# Patient Record
Sex: FEMALE | Race: WHITE | NOT HISPANIC OR LATINO | Employment: OTHER | URBAN - METROPOLITAN AREA
[De-identification: names, ages, dates, MRNs, and addresses within clinical notes are randomized per-mention and may not be internally consistent; named-entity substitution may affect disease eponyms.]

---

## 2019-12-26 ENCOUNTER — TRANSCRIBE ORDERS (OUTPATIENT)
Dept: ADMINISTRATIVE | Facility: HOSPITAL | Age: 84
End: 2019-12-26

## 2019-12-26 DIAGNOSIS — S52.515A CLOSED NONDISPLACED FRACTURE OF STYLOID PROCESS OF LEFT RADIUS, INITIAL ENCOUNTER: Primary | ICD-10-CM

## 2020-01-03 ENCOUNTER — HOSPITAL ENCOUNTER (OUTPATIENT)
Dept: RADIOLOGY | Facility: HOSPITAL | Age: 85
Discharge: HOME | End: 2020-01-03
Payer: MEDICARE

## 2020-01-03 DIAGNOSIS — S52.515A CLOSED NONDISPLACED FRACTURE OF STYLOID PROCESS OF LEFT RADIUS, INITIAL ENCOUNTER: ICD-10-CM

## 2020-01-03 PROCEDURE — 73200 CT UPPER EXTREMITY W/O DYE: CPT

## 2021-08-11 ENCOUNTER — APPOINTMENT (EMERGENCY)
Dept: RADIOLOGY | Facility: HOSPITAL | Age: 86
End: 2021-08-11
Payer: MEDICARE

## 2021-08-11 ENCOUNTER — HOSPITAL ENCOUNTER (EMERGENCY)
Facility: HOSPITAL | Age: 86
Discharge: HOME | End: 2021-08-11
Attending: EMERGENCY MEDICINE | Admitting: EMERGENCY MEDICINE
Payer: MEDICARE

## 2021-08-11 VITALS
WEIGHT: 197 LBS | TEMPERATURE: 97.5 F | DIASTOLIC BLOOD PRESSURE: 72 MMHG | HEART RATE: 72 BPM | SYSTOLIC BLOOD PRESSURE: 170 MMHG | OXYGEN SATURATION: 91 % | RESPIRATION RATE: 16 BRPM

## 2021-08-11 DIAGNOSIS — M25.561 BILATERAL KNEE PAIN: ICD-10-CM

## 2021-08-11 DIAGNOSIS — S00.83XA CONTUSION OF FOREHEAD, INITIAL ENCOUNTER: ICD-10-CM

## 2021-08-11 DIAGNOSIS — M25.562 BILATERAL KNEE PAIN: ICD-10-CM

## 2021-08-11 DIAGNOSIS — R07.89 CHEST WALL PAIN: ICD-10-CM
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
## 2021-08-11 DIAGNOSIS — S01.21XA LACERATION OF NOSE, INITIAL ENCOUNTER: ICD-10-CM

## 2021-08-11 DIAGNOSIS — M50.30 DEGENERATIVE DISC DISEASE, CERVICAL: ICD-10-CM

## 2021-08-11 DIAGNOSIS — S80.01XA CONTUSION OF RIGHT KNEE, INITIAL ENCOUNTER: ICD-10-CM

## 2021-08-11 DIAGNOSIS — S00.81XA ABRASION OF FOREHEAD, INITIAL ENCOUNTER: ICD-10-CM

## 2021-08-11 LAB
ALBUMIN SERPL BCP-MCNC: 3.9 G/DL (ref 3.5–5)
ALP SERPL-CCNC: 72 U/L (ref 46–116)
ALT SERPL W P-5'-P-CCNC: 47 U/L (ref 12–78)
ANION GAP SERPL CALCULATED.3IONS-SCNC: 8 MMOL/L (ref 4–13)
APTT PPP: 36 SECONDS (ref 23–37)
AST SERPL W P-5'-P-CCNC: 55 U/L (ref 5–45)
BASOPHILS # BLD AUTO: 0 THOUSANDS/ΜL (ref 0–0.1)
BASOPHILS NFR BLD AUTO: 0 % (ref 0–1)
BILIRUB SERPL-MCNC: 0.39 MG/DL (ref 0.2–1)
BUN SERPL-MCNC: 15 MG/DL (ref 5–25)
CALCIUM SERPL-MCNC: 9.5 MG/DL (ref 8.3–10.1)
CHLORIDE SERPL-SCNC: 105 MMOL/L (ref 100–108)
CO2 SERPL-SCNC: 25 MMOL/L (ref 21–32)
CREAT SERPL-MCNC: 1.1 MG/DL (ref 0.6–1.3)
EOSINOPHIL # BLD AUTO: 0 THOUSAND/ΜL (ref 0–0.61)
EOSINOPHIL NFR BLD AUTO: 0 % (ref 0–6)
ERYTHROCYTE [DISTWIDTH] IN BLOOD BY AUTOMATED COUNT: 16.7 % (ref 11.6–15.1)
GFR SERPL CREATININE-BSD FRML MDRD: 46 ML/MIN/1.73SQ M
GLUCOSE SERPL-MCNC: 96 MG/DL (ref 65–140)
HCT VFR BLD AUTO: 39.6 % (ref 34.8–46.1)
HGB BLD-MCNC: 12 G/DL (ref 11.5–15.4)
IMM GRANULOCYTES # BLD AUTO: 0.02 THOUSAND/UL (ref 0–0.2)
IMM GRANULOCYTES NFR BLD AUTO: 0 % (ref 0–2)
INR PPP: 0.93 (ref 0.84–1.19)
LYMPHOCYTES # BLD AUTO: 1.56 THOUSANDS/ΜL (ref 0.6–4.47)
LYMPHOCYTES NFR BLD AUTO: 29 % (ref 14–44)
MAGNESIUM SERPL-MCNC: 2.3 MG/DL (ref 1.6–2.6)
MCH RBC QN AUTO: 26.5 PG (ref 26.8–34.3)
MCHC RBC AUTO-ENTMCNC: 30.3 G/DL (ref 31.4–37.4)
MCV RBC AUTO: 88 FL (ref 82–98)
MONOCYTES # BLD AUTO: 0.53 THOUSAND/ΜL (ref 0.17–1.22)
MONOCYTES NFR BLD AUTO: 10 % (ref 4–12)
NEUTROPHILS # BLD AUTO: 3.26 THOUSANDS/ΜL (ref 1.85–7.62)
NEUTS SEG NFR BLD AUTO: 61 % (ref 43–75)
NRBC BLD AUTO-RTO: 0 /100 WBCS
PLATELET # BLD AUTO: 270 THOUSANDS/UL (ref 149–390)
PMV BLD AUTO: 9.4 FL (ref 8.9–12.7)
POTASSIUM SERPL-SCNC: 4.2 MMOL/L (ref 3.5–5.3)
PROT SERPL-MCNC: 7.8 G/DL (ref 6.4–8.2)
PROTHROMBIN TIME: 12.4 SECONDS (ref 11.6–14.5)
RBC # BLD AUTO: 4.52 MILLION/UL (ref 3.81–5.12)
SODIUM SERPL-SCNC: 138 MMOL/L (ref 136–145)
WBC # BLD AUTO: 5.37 THOUSAND/UL (ref 4.31–10.16)

## 2021-08-11 PROCEDURE — 93005 ELECTROCARDIOGRAM TRACING: CPT

## 2021-08-11 PROCEDURE — 96374 THER/PROPH/DIAG INJ IV PUSH: CPT

## 2021-08-11 PROCEDURE — 99285 EMERGENCY DEPT VISIT HI MDM: CPT | Performed by: EMERGENCY MEDICINE

## 2021-08-11 PROCEDURE — 80053 COMPREHEN METABOLIC PANEL: CPT | Performed by: EMERGENCY MEDICINE

## 2021-08-11 PROCEDURE — 74177 CT ABD & PELVIS W/CONTRAST: CPT

## 2021-08-11 PROCEDURE — 96361 HYDRATE IV INFUSION ADD-ON: CPT

## 2021-08-11 PROCEDURE — 70486 CT MAXILLOFACIAL W/O DYE: CPT

## 2021-08-11 PROCEDURE — 73564 X-RAY EXAM KNEE 4 OR MORE: CPT

## 2021-08-11 PROCEDURE — 70450 CT HEAD/BRAIN W/O DYE: CPT

## 2021-08-11 PROCEDURE — 85025 COMPLETE CBC W/AUTO DIFF WBC: CPT | Performed by: EMERGENCY MEDICINE

## 2021-08-11 PROCEDURE — 36415 COLL VENOUS BLD VENIPUNCTURE: CPT | Performed by: EMERGENCY MEDICINE

## 2021-08-11 PROCEDURE — 99284 EMERGENCY DEPT VISIT MOD MDM: CPT

## 2021-08-11 PROCEDURE — 85730 THROMBOPLASTIN TIME PARTIAL: CPT | Performed by: EMERGENCY MEDICINE

## 2021-08-11 PROCEDURE — 71260 CT THORAX DX C+: CPT

## 2021-08-11 PROCEDURE — 72125 CT NECK SPINE W/O DYE: CPT

## 2021-08-11 PROCEDURE — 96375 TX/PRO/DX INJ NEW DRUG ADDON: CPT

## 2021-08-11 PROCEDURE — 83735 ASSAY OF MAGNESIUM: CPT | Performed by: EMERGENCY MEDICINE

## 2021-08-11 PROCEDURE — 85610 PROTHROMBIN TIME: CPT | Performed by: EMERGENCY MEDICINE

## 2021-08-11 RX ORDER — GINSENG 100 MG
1 CAPSULE ORAL ONCE
Status: COMPLETED | OUTPATIENT
Start: 2021-08-11 | End: 2021-08-11

## 2021-08-11 RX ORDER — MORPHINE SULFATE 4 MG/ML
4 INJECTION, SOLUTION INTRAMUSCULAR; INTRAVENOUS ONCE
Status: COMPLETED | OUTPATIENT
Start: 2021-08-11 | End: 2021-08-11

## 2021-08-11 RX ORDER — LIDOCAINE 50 MG/G
1 PATCH TOPICAL ONCE
Status: DISCONTINUED | OUTPATIENT
Start: 2021-08-11 | End: 2021-08-12 | Stop reason: HOSPADM

## 2021-08-11 RX ORDER — LIDOCAINE 50 MG/G
1 PATCH TOPICAL DAILY
Qty: 30 PATCH | Refills: 0 | Status: SHIPPED | OUTPATIENT
Start: 2021-08-11

## 2021-08-11 RX ORDER — LOSARTAN POTASSIUM 50 MG/1
50 TABLET ORAL DAILY
COMMUNITY

## 2021-08-11 RX ORDER — FENTANYL CITRATE 50 UG/ML
25 INJECTION, SOLUTION INTRAMUSCULAR; INTRAVENOUS ONCE
Status: COMPLETED | OUTPATIENT
Start: 2021-08-11 | End: 2021-08-11

## 2021-08-11 RX ORDER — MULTIVITAMIN
1 TABLET ORAL DAILY
COMMUNITY

## 2021-08-11 RX ORDER — BACITRACIN, NEOMYCIN, POLYMYXIN B 400; 3.5; 5 [USP'U]/G; MG/G; [USP'U]/G
OINTMENT TOPICAL 2 TIMES DAILY
Qty: 15 G | Refills: 0 | Status: SHIPPED | OUTPATIENT
Start: 2021-08-11 | End: 2021-08-18

## 2021-08-11 RX ORDER — SENNOSIDES 8.6 MG
650 CAPSULE ORAL EVERY 8 HOURS PRN
Qty: 30 TABLET | Refills: 0 | Status: SHIPPED | OUTPATIENT
Start: 2021-08-11

## 2021-08-11 RX ADMIN — BACITRACIN ZINC 1 SMALL APPLICATION: 500 OINTMENT TOPICAL at 21:52

## 2021-08-11 RX ADMIN — SODIUM CHLORIDE 1000 ML: 0.9 INJECTION, SOLUTION INTRAVENOUS at 20:31

## 2021-08-11 RX ADMIN — LIDOCAINE 5% 1 PATCH: 700 PATCH TOPICAL at 22:15

## 2021-08-11 RX ADMIN — FENTANYL CITRATE 25 MCG: 50 INJECTION, SOLUTION INTRAMUSCULAR; INTRAVENOUS at 19:50

## 2021-08-11 RX ADMIN — IOHEXOL 100 ML: 350 INJECTION, SOLUTION INTRAVENOUS at 20:29

## 2021-08-11 RX ADMIN — MORPHINE SULFATE 4 MG: 4 INJECTION INTRAVENOUS at 22:07

## 2021-08-12 NOTE — ED PROVIDER NOTES
History  Chief Complaint   Patient presents with   Efrain Leriche     states fell going up a step  landed on head; bruise and hematoma to L side of head  no loc no thinners   Head Injury    Rib Injury     c/o L rib pain since fall with pain on breathing     28-year-old female presents to the ED for evaluation after a fall  Earlier today patient took 1 with stepped up to her wooden deck when she accidentally stepped on something and fell forward to the wooden deck  Patient fell on the left side of her body  Patient now has a contusion with hematoma to left side of her forehead as well as a small laceration to the nasal bridge  Patient recalls the whole fall  Patient denies any LOC  Patient was able to get up on her own afterwards  Fall was unwitnessed  Patient then called her family member who brought to the ED for further evaluation  Patient is not on any blood thinners however she does take Excedrin for headaches  Patient states that she started to have some headache after the fall  Patient took Excedrin prior to arrival to the ED  History provided by:  Patient  Fall  Associated symptoms: headaches    Associated symptoms: no abdominal pain, no back pain, no chest pain and no nausea        Prior to Admission Medications   Prescriptions Last Dose Informant Patient Reported? Taking? Multiple Vitamin (multivitamin) tablet   Yes Yes   Sig: Take 1 tablet by mouth daily   Nutritional Supplements (VITAMIN D BOOSTER PO)   Yes Yes   Sig: Take by mouth once a week   losartan (COZAAR) 50 mg tablet   Yes Yes   Sig: Take 50 mg by mouth daily      Facility-Administered Medications: None       Past Medical History:   Diagnosis Date    Disease of thyroid gland     Hypercholesteremia     Hypertension        Past Surgical History:   Procedure Laterality Date    GALLBLADDER SURGERY      JOINT REPLACEMENT      ORTHOPEDIC SURGERY         History reviewed  No pertinent family history    I have reviewed and agree with the history as documented  E-Cigarette/Vaping    E-Cigarette Use Never User      E-Cigarette/Vaping Substances     Social History     Tobacco Use    Smoking status: Never Smoker    Smokeless tobacco: Never Used   Vaping Use    Vaping Use: Never used   Substance Use Topics    Alcohol use: Yes     Comment: socially    Drug use: Not Currently       Review of Systems   Constitutional: Negative for activity change, appetite change, chills and fever  HENT: Negative for congestion and ear pain  Eyes: Negative for pain and discharge  Respiratory: Negative for cough, chest tightness, shortness of breath, wheezing and stridor  Cardiovascular: Negative for chest pain and palpitations  Gastrointestinal: Negative for abdominal distention, abdominal pain, constipation, diarrhea and nausea  Endocrine: Negative for cold intolerance  Genitourinary: Negative for dysuria, frequency and urgency  Musculoskeletal: Negative for arthralgias and back pain  Skin: Positive for color change and wound  Negative for rash  Allergic/Immunologic: Negative for environmental allergies and food allergies  Neurological: Positive for headaches  Negative for dizziness, weakness and numbness  Hematological: Negative for adenopathy  Psychiatric/Behavioral: Negative for agitation, behavioral problems and confusion  The patient is not nervous/anxious  All other systems reviewed and are negative  Physical Exam  Physical Exam  Vitals and nursing note reviewed  Constitutional:       Appearance: She is well-developed  HENT:      Head: Normocephalic and atraumatic  Nose:      Comments: Superficial 5 mm laceration noted to the nasal bridge  5 mm x 5 mm area of skin tear noted to the left forehead  Large area of hematoma and contusion noted to the left forehead  Eyes:      Extraocular Movements: Extraocular movements intact        Conjunctiva/sclera: Conjunctivae normal       Pupils: Pupils are equal, round, and reactive to light  Cardiovascular:      Rate and Rhythm: Normal rate and regular rhythm  Heart sounds: Normal heart sounds  Pulmonary:      Effort: Pulmonary effort is normal       Breath sounds: Normal breath sounds  Comments: Tenderness to palpation noted to the left anterior chest wall along ribs 3-5 at the base of the left breast   No ecchymosis erythema, or skin changes noted  No obvious bony deformity noted to the area  Lungs are otherwise clear to auscultation bilateral   Abdominal:      General: Bowel sounds are normal  There is no distension  Palpations: Abdomen is soft  Tenderness: There is no abdominal tenderness  Musculoskeletal:         General: Normal range of motion  Cervical back: Normal range of motion and neck supple  Comments: Pulses intact bilateral upper and lower extremities  Tenderness to palpation noted to the anterior aspect of bilateral knees  Range of motion of knees are intact  Mild contusion noted to the anterior aspect of right knee  Skin:     General: Skin is warm and dry  Neurological:      General: No focal deficit present  Mental Status: She is alert and oriented to person, place, and time  Comments: No focal neuro deficits noted on exam    Psychiatric:         Behavior: Behavior normal          Thought Content:  Thought content normal          Judgment: Judgment normal          Vital Signs  ED Triage Vitals [08/11/21 1827]   Temperature Pulse Respirations Blood Pressure SpO2   97 5 °F (36 4 °C) 79 16 (!) 203/89 98 %      Temp Source Heart Rate Source Patient Position - Orthostatic VS BP Location FiO2 (%)   Tympanic Monitor Sitting Left arm --      Pain Score       4           Vitals:    08/11/21 1827 08/11/21 2045 08/11/21 2228   BP: (!) 203/89 (!) 184/89 170/72   Pulse: 79 72    Patient Position - Orthostatic VS: Sitting           Visual Acuity      ED Medications  Medications   lidocaine (LIDODERM) 5 % patch 1 patch (1 patch Topical Medication Applied 8/11/21 2215)   sodium chloride 0 9 % bolus 1,000 mL (0 mL Intravenous Stopped 8/11/21 2132)   fentanyl citrate (PF) 100 MCG/2ML 25 mcg (25 mcg Intravenous Given 8/11/21 1950)   iohexol (OMNIPAQUE) 350 MG/ML injection (SINGLE-DOSE) 100 mL (100 mL Intravenous Given 8/11/21 2029)   bacitracin topical ointment 1 small application (1 small application Topical Given 8/11/21 2152)   morphine (PF) 4 mg/mL injection 4 mg (4 mg Intravenous Given 8/11/21 2207)       Diagnostic Studies  Results Reviewed     Procedure Component Value Units Date/Time    Comprehensive metabolic panel [[id number]]  (Abnormal) Collected: 08/11/21 1952    Lab Status: Final result Specimen: Blood from Arm, Right Updated: 08/11/21 2013     Sodium 138 mmol/L      Potassium 4 2 mmol/L      Chloride 105 mmol/L      CO2 25 mmol/L      ANION GAP 8 mmol/L      BUN 15 mg/dL      Creatinine 1 10 mg/dL      Glucose 96 mg/dL      Calcium 9 5 mg/dL      AST 55 U/L      ALT 47 U/L      Alkaline Phosphatase 72 U/L      Total Protein 7 8 g/dL      Albumin 3 9 g/dL      Total Bilirubin 0 39 mg/dL      eGFR 46 ml/min/1 73sq m     Narrative:      [place] guidelines for Chronic Kidney Disease (CKD):     Stage 1 with normal or high GFR (GFR > 90 mL/min/1 73 square meters)    Stage 2 Mild CKD (GFR = 60-89 mL/min/1 73 square meters)    Stage 3A Moderate CKD (GFR = 45-59 mL/min/1 73 square meters)    Stage 3B Moderate CKD (GFR = 30-44 mL/min/1 73 square meters)    Stage 4 Severe CKD (GFR = 15-29 mL/min/1 73 square meters)    Stage 5 End Stage CKD (GFR <15 mL/min/1 73 square meters)  Note: GFR calculation is accurate only with a steady state creatinine    Magnesium [[id number]]  (Normal) Collected: 08/11/21 1952    Lab Status: Final result Specimen: Blood from Arm, Right Updated: 08/11/21 2013     Magnesium 2 3 mg/dL     Protime-INR [[id number]]  (Normal) Collected: 08/11/21 1952    Lab Status: Final result Specimen: Blood from Arm, Right Updated: 08/11/21 2009     Protime 12 4 seconds      INR 0 93    APTT [[id number]]  (Normal) Collected: 08/11/21 1952    Lab Status: Final result Specimen: Blood from Arm, Right Updated: 08/11/21 2009     PTT 36 seconds     CBC and differential [[id number]]  (Abnormal) Collected: 08/11/21 1952    Lab Status: Final result Specimen: Blood from Arm, Right Updated: 08/11/21 1957     WBC 5 37 Thousand/uL      RBC 4 52 Million/uL      Hemoglobin 12 0 g/dL      Hematocrit 39 6 %      MCV 88 fL      MCH 26 5 pg      MCHC 30 3 g/dL      RDW 16 7 %      MPV 9 4 fL      Platelets 460 Thousands/uL      nRBC 0 /100 WBCs      Neutrophils Relative 61 %      Immat GRANS % 0 %      Lymphocytes Relative 29 %      Monocytes Relative 10 %      Eosinophils Relative 0 %      Basophils Relative 0 %      Neutrophils Absolute 3 26 Thousands/µL      Immature Grans Absolute 0 02 Thousand/uL      Lymphocytes Absolute 1 56 Thousands/µL      Monocytes Absolute 0 53 Thousand/µL      Eosinophils Absolute 0 00 Thousand/µL      Basophils Absolute 0 00 Thousands/µL     UA w Reflex to Microscopic w Reflex to Culture [[id number]]     Lab Status: No result Specimen: Urine                  TRAUMA - CT chest abdomen pelvis w contrast   Final Result by Sharee Eastman MD (08/11 2049)      No acute findings in the chest, abdomen or pelvis  Nonemergent findings above  Workstation performed: [id number]         XR knee 4+ views left injury   Final Result by Sharee Eastman MD (08/11 2052)      No acute osseous abnormality  Nonemergent findings above  Workstation performed: [id number]         XR knee 4+ views Right injury   Final Result by Sharee Eastman MD (08/11 2051)      No acute osseous abnormality  Nonemergent findings above  Workstation performed: [id number]         TRAUMA - CT facial bones wo contrast   Final Result by Sharee Eastman MD (08/11 2042)      No facial bone fracture  Workstation performed: [id number]         TRAUMA - CT spine cervical wo contrast   Final Result by Gloria Weiss MD (08/11 2039)      No cervical spine fracture or traumatic malalignment  Several nonemergent findings above  Workstation performed: [id number]         TRAUMA - CT head wo contrast   Final Result by Minesh Vogt MD (08/11 2026)         1  No acute intracranial hemorrhage, mass effect or extra-axial collection  2   Left anterior frontal scalp hematoma  No acute calvarial fracture  Workstation performed: [id number]                    Procedures  ECG 12 Lead Documentation Only    Date/Time: 8/11/2021 7:40 PM  Performed by: Paige Hannah DO  Authorized by: Paige Hannah DO     Indications / Diagnosis:  Fall  ECG reviewed by me, the ED Provider: yes    Patient location:  ED  Previous ECG:     Previous ECG:  Unavailable  Interpretation:     Interpretation: normal    Comments:      Sinus rhythm, rate 65, normal axis, normal intervals, occasional PACs noted, no acute ST/T-wave abnormalities noted, sinus rhythm with PAC, no previous EKG available for comparison               ED Course                                           MDM  Number of Diagnoses or Management Options  Abrasion of forehead, initial encounter  Bilateral knee pain  Chest wall pain  Contusion of forehead, initial encounter  Contusion of right knee, initial encounter  Degenerative disc disease, cervical  Fall  Laceration of nose, initial encounter  Diagnosis management comments: Obtain blood work, CT head/C-spine/facial bones/chest/abdomen/pelvis, x-ray of bilateral knees  Give IV fluids, pain medication reassess       Amount and/or Complexity of Data Reviewed  Clinical lab tests: ordered and reviewed  Tests in the radiology section of CPT®: ordered and reviewed  Tests in the medicine section of CPT®: ordered and reviewed  Review and summarize past medical records: yes  Independent visualization of images, tracings, or specimens: yes    Risk of Complications, Morbidity, and/or Mortality  General comments: No acute traumatic abnormalities noted on radiology results  There were some degenerative changes noted to the cervical spine  Patient referred to Orthopedic surgery for these degenerative changes  Bacitracin was applied to the laceration of the nasal bridge agrees well as skin tear of the forehead  At this time patient is discharged home on p r n  Tylenol and Lidoderm patch for pain, Neosporin for laceration abrasion, and follow-up to PCP in 3-4 days  Patient ambulated in the ED without any difficulty  Close return instructions given to return to the ER for any worsening symptoms  Patient agrees with discharge plan  Patient well appearing at time of discharge  Please Note: Fluency Direct voice recognition software may have been used in the creation of this document  Wrong words or sound a like substitutions may have occurred due to the inherent limitations of the voice software  Disposition  Final diagnoses:   Fall   Laceration of nose, initial encounter   Contusion of forehead, initial encounter   Abrasion of forehead, initial encounter   Contusion of right knee, initial encounter   Bilateral knee pain   Degenerative disc disease, cervical   Chest wall pain     Time reflects when diagnosis was documented in both MDM as applicable and the Disposition within this note     Time User Action Codes Description Comment    8/11/2021  9:25 PM Sixto Grapes Add [W19  XXXA] Fall     8/11/2021  9:25 PM Sixto Grapes Add [K27 05KW] Laceration of nose, initial encounter     8/11/2021  9:26 PM Ferdous, Elmarie Drain Add [O97 [id number]] Contusion of forehead, initial encounter     8/11/2021  9:27 PM Ferdous, Elmarie Drain Add [S00 81XA] Abrasion of forehead, initial encounter     8/11/2021  9:27 PM Ferdous, Elmarie Drain Add [S80 01XA] Contusion of right knee, initial encounter     8/11/2021  9:27 PM Chilango Mail Add [L52 109,  M24 562] Bilateral knee pain     8/11/2021  9:28 PM Chilango Mail Add [M50 30] Degenerative disc disease, cervical     8/11/2021 10:03 PM Chilango Mail Add [R07 89] Chest wall pain       ED Disposition     ED Disposition Condition Date/Time Comment    Discharge Stable Wed Aug 11, 2021  9:47 PM Clemencia Kitchen discharge to home/self care  Follow-up Information     Follow up With Specialties Details Why Contact Info    Batool Suazo MD Internal Medicine In 2 days  [address]  [phone]      Stella Meeks DO Orthopedic Surgery Schedule an appointment as soon as possible for a visit  your CT scan showed degenerative disc disease  Please discuss this further with your appointment  [address]   [phone]            Discharge Medication List as of 8/11/2021 10:26 PM      START taking these medications    Details   acetaminophen (TYLENOL) 650 mg CR tablet Take 1 tablet (650 mg total) by mouth every 8 (eight) hours as needed for mild pain, Starting Wed 8/11/2021, Normal      lidocaine (LIDODERM) 5 % Apply 1 patch topically daily Remove & Discard patch within 12 hours or as directed by MD, Starting Wed 8/11/2021, Normal      neomycin-bacitracin-polymyxin b (NEOSPORIN) ointment Apply topically 2 (two) times a day for 7 days, Starting Wed 8/11/2021, Until Wed 8/18/2021, Normal         CONTINUE these medications which have NOT CHANGED    Details   losartan (COZAAR) 50 mg tablet Take 50 mg by mouth daily, Historical Med      Multiple Vitamin (multivitamin) tablet Take 1 tablet by mouth daily, Historical Med      Nutritional Supplements (VITAMIN D BOOSTER PO) Take by mouth once a week, Historical Med           No discharge procedures on file      PDMP Review     None          ED Provider  Electronically Signed by           Stan Cruz DO  08/11/21 7663

## 2021-08-16 LAB
ATRIAL RATE: 65 BPM
P AXIS: 23 DEGREES
PR INTERVAL: 198 MS
QRS AXIS: 48 DEGREES
QRSD INTERVAL: 92 MS
QT INTERVAL: 396 MS
QTC INTERVAL: 411 MS
T WAVE AXIS: 68 DEGREES
VENTRICULAR RATE: 65 BPM

## 2021-08-16 PROCEDURE — 93010 ELECTROCARDIOGRAM REPORT: CPT | Performed by: INTERNAL MEDICINE
